# Patient Record
Sex: FEMALE | Race: BLACK OR AFRICAN AMERICAN | Employment: FULL TIME | URBAN - METROPOLITAN AREA
[De-identification: names, ages, dates, MRNs, and addresses within clinical notes are randomized per-mention and may not be internally consistent; named-entity substitution may affect disease eponyms.]

---

## 2017-03-10 ENCOUNTER — OFFICE VISIT (OUTPATIENT)
Dept: ENT CLINIC | Age: 27
End: 2017-03-10

## 2017-03-10 VITALS — BODY MASS INDEX: 27.32 KG/M2 | HEIGHT: 66 IN | WEIGHT: 170 LBS

## 2017-03-10 DIAGNOSIS — H60.311 ACUTE DIFFUSE OTITIS EXTERNA OF RIGHT EAR: Primary | ICD-10-CM

## 2017-03-10 PROCEDURE — 99213 OFFICE O/P EST LOW 20 MIN: CPT | Performed by: OTOLARYNGOLOGY

## 2017-03-10 RX ORDER — HYDROCORTISONE AND ACETIC ACID 20.75; 10.375 MG/ML; MG/ML
SOLUTION AURICULAR (OTIC)
Qty: 10 ML | Refills: 0 | Status: SHIPPED | OUTPATIENT
Start: 2017-03-10 | End: 2017-03-21 | Stop reason: ALTCHOICE

## 2017-03-17 ENCOUNTER — TELEPHONE (OUTPATIENT)
Dept: ENT CLINIC | Age: 27
End: 2017-03-17

## 2017-03-21 ENCOUNTER — OFFICE VISIT (OUTPATIENT)
Dept: ENT CLINIC | Age: 27
End: 2017-03-21

## 2017-03-21 VITALS
DIASTOLIC BLOOD PRESSURE: 79 MMHG | HEIGHT: 66 IN | RESPIRATION RATE: 22 BRPM | SYSTOLIC BLOOD PRESSURE: 126 MMHG | HEART RATE: 93 BPM | WEIGHT: 168 LBS | BODY MASS INDEX: 27 KG/M2

## 2017-03-21 DIAGNOSIS — H69.81 EUSTACHIAN TUBE DYSFUNCTION, RIGHT: Primary | ICD-10-CM

## 2017-03-21 PROCEDURE — 99213 OFFICE O/P EST LOW 20 MIN: CPT | Performed by: OTOLARYNGOLOGY

## 2017-03-24 ENCOUNTER — TELEPHONE (OUTPATIENT)
Dept: ENT CLINIC | Age: 27
End: 2017-03-24

## 2017-03-28 ENCOUNTER — TELEPHONE (OUTPATIENT)
Dept: ENT CLINIC | Age: 27
End: 2017-03-28

## 2018-01-04 ENCOUNTER — OFFICE VISIT (OUTPATIENT)
Dept: ENT CLINIC | Age: 28
End: 2018-01-04

## 2018-01-04 VITALS — SYSTOLIC BLOOD PRESSURE: 134 MMHG | DIASTOLIC BLOOD PRESSURE: 82 MMHG | HEART RATE: 92 BPM

## 2018-01-04 DIAGNOSIS — H69.81 DYSFUNCTION OF RIGHT EUSTACHIAN TUBE: Primary | ICD-10-CM

## 2018-01-04 PROCEDURE — 99213 OFFICE O/P EST LOW 20 MIN: CPT | Performed by: OTOLARYNGOLOGY

## 2018-01-04 RX ORDER — METHYLPREDNISOLONE 4 MG/1
TABLET ORAL
Qty: 1 KIT | Refills: 0 | Status: SHIPPED | OUTPATIENT
Start: 2018-01-04 | End: 2018-01-10

## 2018-01-04 NOTE — PROGRESS NOTES
The patient has been seen here in the past for right-sided ear pain. In the past, she was thought to have an external otitis and prescribed topical medication. Today however the complaints are more related to pressure and deeper inside the right ear    There has been no hearing loss, otorrhea, tinnitus, or vertigo. The left ear is not involved    There are no other symptoms referable to the upper aerodigestive tract    Exam:  Tuning fork tests:  Rinne to in midline at 256 cps; Kitchen shows air conduction greater than bone on right and air conduction greater than bone conduction on left at 512 cps     The pinnae are normal in appearance. Otoscopy reveals clear ear canals without cerumen or keratin debris. The left eardrum is normal with good aeration of the middle ear space. There is no attic retraction, and a normal light reflex. The right eardrum shows mild retraction at the pars tensa        The external nose is unremarkable including the dorsum, columella, nasion, and alae. The turbinates appear normal. The middle and inferior meatuses appeared clear. There is no polyp, ulceration, or masses visualized either naris. The septum is not deviated or deflected to a significant degree.   Full ROM of TMJ without crepitus either audible or palpable    Impression:  Suspicion for ventilation problem given relationship of her ear to her breathing; secondary eustachian tube dysfunction  No middle ear fluid or suggestion of conductive hearing loss is made clinically  No further evidence for external ear etiology    Plan:  Medrol Dosepak 4 mg as directed  Adequate hydration and Valsalva discussed  Check back in one week as needed

## 2023-02-01 ENCOUNTER — HOSPITAL ENCOUNTER (OUTPATIENT)
Dept: GENERAL RADIOLOGY | Age: 33
Discharge: HOME | End: 2023-02-01
Payer: COMMERCIAL

## 2023-02-01 DIAGNOSIS — J45.40 MODERATE PERSISTENT ASTHMA, UNSPECIFIED WHETHER COMPLICATED: ICD-10-CM

## 2023-02-01 DIAGNOSIS — R05.3 CHRONIC COUGH: ICD-10-CM

## 2023-02-01 PROCEDURE — 71046 X-RAY EXAM CHEST 2 VIEWS: CPT
